# Patient Record
Sex: FEMALE | Race: WHITE | Employment: OTHER | URBAN - METROPOLITAN AREA
[De-identification: names, ages, dates, MRNs, and addresses within clinical notes are randomized per-mention and may not be internally consistent; named-entity substitution may affect disease eponyms.]

---

## 2019-06-25 PROCEDURE — 83520 IMMUNOASSAY QUANT NOS NONAB: CPT | Performed by: OBSTETRICS & GYNECOLOGY

## 2021-03-15 ENCOUNTER — LAB ENCOUNTER (OUTPATIENT)
Dept: LAB | Age: 36
End: 2021-03-15
Attending: OBSTETRICS & GYNECOLOGY
Payer: COMMERCIAL

## 2021-03-15 DIAGNOSIS — Z01.812 PRE-OPERATIVE LABORATORY EXAMINATION: ICD-10-CM

## 2021-03-16 LAB — SARS-COV-2 RNA RESP QL NAA+PROBE: NOT DETECTED

## 2021-07-14 NOTE — PROGRESS NOTES
Ann Marie P[facility]rez Consultation    Dear Dr. Maranda Carbajal,    Thank you for requesting ultrasound evaluation and maternal fetal medicine consultation on your patient Alessia Smiley.  As you are aware she is a 31year old female with a Singleto Estradiol (ESTRACE OR), Take by mouth., Disp: , Rfl:   •  FLUoxetine HCl (PROZAC) 20 MG Oral Cap, Take 20 mg by mouth daily. , Disp: , Rfl:   Allergies:   Vicodin [Hydrocodon*    NAUSEA AND VOMITING  Benadryl [Diphenhyd*    OTHER (SEE COMMENTS)    Comment:P birth and low birth weight in guerra pregnancies. ART does not appear to be an independent risk factor for adverse neurodevelopment outcome.  ART appears to be at increased risk of delivering offspring with congenital malformations compared with fertile that some medications may be used safely during pregnancy, knowledge regarding the risks of prenatal exposure to psychotropic medications is incomplete.  Thus, it is relatively common for patients to discontinue or to avoid pharmacologic treatment during pr Teratogenesis  The baseline incidence of major congenital malformations in newborns born in the [place] is estimated to be between 2 and 4%.  During the earliest stages of pregnancy, formation of major organ systems takes place and is complete within Nonteratogenic effects that may been seen in the  period include respiratory distress, cyanosis, apnea, siezures, temperature instability, feeding difficulty, vomiting, hypoglycemia, increased or decreased tone, irritability, crying, hyper-reflexia, SSRI's after delviery, given their prolonged half-life, clinical effectiveness can take several weeks. This exposes the patient to potentially significant risks of exacerbation of her mood disorder at a particularly vulnerable time.  This further emphasizes

## 2021-07-27 ENCOUNTER — OFFICE VISIT (OUTPATIENT)
Dept: PERINATAL CARE | Facility: HOSPITAL | Age: 36
End: 2021-07-27
Attending: OBSTETRICS & GYNECOLOGY
Payer: COMMERCIAL

## 2021-07-27 VITALS
WEIGHT: 172 LBS | SYSTOLIC BLOOD PRESSURE: 106 MMHG | BODY MASS INDEX: 28.66 KG/M2 | HEIGHT: 65 IN | HEART RATE: 68 BPM | DIASTOLIC BLOOD PRESSURE: 71 MMHG

## 2021-07-27 DIAGNOSIS — O09.819 ENCOUNTER FOR SUPERVISION OF PREGNANCY RESULTING FROM ASSISTED REPRODUCTIVE TECHNOLOGY: ICD-10-CM

## 2021-07-27 DIAGNOSIS — O09.819 PREGNANCY, HIGH-RISK, ASSISTED REPRODUCTIVE TECHNOLOGY: ICD-10-CM

## 2021-07-27 DIAGNOSIS — Z03.75 CERVICAL SHORTENING SUSPECTED BUT NOT FOUND: ICD-10-CM

## 2021-07-27 DIAGNOSIS — O09.819 ENCOUNTER FOR SUPERVISION OF PREGNANCY RESULTING FROM ASSISTED REPRODUCTIVE TECHNOLOGY: Primary | ICD-10-CM

## 2021-07-27 PROCEDURE — 76817 TRANSVAGINAL US OBSTETRIC: CPT | Performed by: OBSTETRICS & GYNECOLOGY

## 2021-07-27 PROCEDURE — 76811 OB US DETAILED SNGL FETUS: CPT | Performed by: OBSTETRICS & GYNECOLOGY

## 2021-07-27 PROCEDURE — 99244 OFF/OP CNSLTJ NEW/EST MOD 40: CPT | Performed by: OBSTETRICS & GYNECOLOGY

## 2021-07-27 PROCEDURE — 76817 TRANSVAGINAL US OBSTETRIC: CPT

## 2021-08-17 NOTE — PROGRESS NOTES
Consuelo Burnham     Dear Dr. Ary Lara,     Thank you for requesting ultrasound evaluation and maternal fetal medicine consultation on your patient Alessia Smiley.  As you are aware she is a 31year old female  with a S rhythm. The patient was made aware of the limitations of the fetal heart study: malformations such as but not limiting to minor valve , VSD, anomalous pulmonary venus return, or coarctation of the aorta maybe missed.  I discussed the results with the patien

## 2021-08-24 ENCOUNTER — ULTRASOUND ENCOUNTER (OUTPATIENT)
Dept: PERINATAL CARE | Facility: HOSPITAL | Age: 36
End: 2021-08-24
Attending: OBSTETRICS & GYNECOLOGY
Payer: COMMERCIAL

## 2021-08-24 VITALS
DIASTOLIC BLOOD PRESSURE: 74 MMHG | BODY MASS INDEX: 29.66 KG/M2 | HEART RATE: 78 BPM | WEIGHT: 178 LBS | HEIGHT: 65 IN | SYSTOLIC BLOOD PRESSURE: 110 MMHG

## 2021-08-24 DIAGNOSIS — O09.819 PREGNANCY, HIGH-RISK, ASSISTED REPRODUCTIVE TECHNOLOGY: ICD-10-CM

## 2021-08-24 DIAGNOSIS — O09.891 MEDICATION EXPOSURE DURING FIRST TRIMESTER OF PREGNANCY: ICD-10-CM

## 2021-08-24 DIAGNOSIS — F41.9 ANXIETY: ICD-10-CM

## 2021-08-24 DIAGNOSIS — O09.819 PREGNANCY, HIGH-RISK, ASSISTED REPRODUCTIVE TECHNOLOGY: Primary | ICD-10-CM

## 2021-08-24 PROCEDURE — 76827 ECHO EXAM OF FETAL HEART: CPT | Performed by: OBSTETRICS & GYNECOLOGY

## 2021-08-24 PROCEDURE — 76825 ECHO EXAM OF FETAL HEART: CPT | Performed by: OBSTETRICS & GYNECOLOGY

## 2021-08-24 PROCEDURE — 76827 ECHO EXAM OF FETAL HEART: CPT

## 2021-08-24 PROCEDURE — 99214 OFFICE O/P EST MOD 30 MIN: CPT | Performed by: OBSTETRICS & GYNECOLOGY

## 2021-08-24 PROCEDURE — 93325 DOPPLER ECHO COLOR FLOW MAPG: CPT | Performed by: OBSTETRICS & GYNECOLOGY

## 2021-08-24 PROCEDURE — 93325 DOPPLER ECHO COLOR FLOW MAPG: CPT

## 2021-12-11 ENCOUNTER — HOSPITAL ENCOUNTER (OUTPATIENT)
Facility: HOSPITAL | Age: 36
Setting detail: OBSERVATION
Discharge: HOME | End: 2021-12-11
Attending: OBSTETRICS & GYNECOLOGY | Admitting: OBSTETRICS & GYNECOLOGY
Payer: COMMERCIAL

## 2021-12-11 VITALS
SYSTOLIC BLOOD PRESSURE: 115 MMHG | HEIGHT: 65 IN | BODY MASS INDEX: 34.72 KG/M2 | RESPIRATION RATE: 18 BRPM | DIASTOLIC BLOOD PRESSURE: 72 MMHG | TEMPERATURE: 98 F | WEIGHT: 208.38 LBS | HEART RATE: 71 BPM

## 2021-12-11 PROBLEM — Z34.90 PREGNANCY: Status: ACTIVE | Noted: 2021-12-11

## 2021-12-11 PROCEDURE — 59025 FETAL NON-STRESS TEST: CPT

## 2021-12-11 PROCEDURE — 99214 OFFICE O/P EST MOD 30 MIN: CPT

## 2021-12-11 PROCEDURE — 4A0HXCZ MEASUREMENT OF PRODUCTS OF CONCEPTION, CARDIAC RATE, EXTERNAL APPROACH: ICD-10-PCS | Performed by: OBSTETRICS & GYNECOLOGY

## 2021-12-11 NOTE — NST
Nonstress Test   Patient: Alessia Smiley    Gestation: 39w4d    NST:       Variability: Moderate           Accelerations: Yes           Decelerations: None            Baseline: 140 BPM           Uterine Irritability: No           Contractions: Randle Mortimer

## 2021-12-11 NOTE — PROGRESS NOTES
Pt found dozing on cart, stating that she didn't sleep much last night.  Contractions are less painful while lying down. efm applied and pt instructed on plan of care

## 2021-12-11 NOTE — PROGRESS NOTES
at 44 & 4/7 weeks gestation presents to triage 2 reporting onset of contractions at 0100.   Patient states the contractions have become less intense since arriving at the hospital.  Patient also reports she had a gush of fluid on Thursday and another g

## 2021-12-11 NOTE — PROGRESS NOTES
Pt stated that\"I'd rather walk at home to get this going\"  Pt given labor precautions, use of a birthing ball for early labor, and kick counts. Pt discharged with instructions and verbalized understanding.   Pt also states that she \"only lives 15 min yobany

## 2021-12-13 ENCOUNTER — ANESTHESIA EVENT (OUTPATIENT)
Dept: OBGYN UNIT | Facility: HOSPITAL | Age: 36
End: 2021-12-13
Payer: COMMERCIAL

## 2021-12-13 ENCOUNTER — ANESTHESIA (OUTPATIENT)
Dept: OBGYN UNIT | Facility: HOSPITAL | Age: 36
End: 2021-12-13
Payer: COMMERCIAL

## 2021-12-13 ENCOUNTER — HOSPITAL ENCOUNTER (INPATIENT)
Facility: HOSPITAL | Age: 36
LOS: 2 days | Discharge: HOME | End: 2021-12-15
Attending: OBSTETRICS & GYNECOLOGY | Admitting: OBSTETRICS & GYNECOLOGY
Payer: COMMERCIAL

## 2021-12-13 PROCEDURE — 0HQ9XZZ REPAIR PERINEUM SKIN, EXTERNAL APPROACH: ICD-10-PCS | Performed by: OBSTETRICS & GYNECOLOGY

## 2021-12-13 PROCEDURE — 86900 BLOOD TYPING SEROLOGIC ABO: CPT | Performed by: OBSTETRICS & GYNECOLOGY

## 2021-12-13 PROCEDURE — 86780 TREPONEMA PALLIDUM: CPT | Performed by: OBSTETRICS & GYNECOLOGY

## 2021-12-13 PROCEDURE — 85025 COMPLETE CBC W/AUTO DIFF WBC: CPT | Performed by: OBSTETRICS & GYNECOLOGY

## 2021-12-13 PROCEDURE — 99214 OFFICE O/P EST MOD 30 MIN: CPT

## 2021-12-13 PROCEDURE — 86901 BLOOD TYPING SEROLOGIC RH(D): CPT | Performed by: OBSTETRICS & GYNECOLOGY

## 2021-12-13 PROCEDURE — 86850 RBC ANTIBODY SCREEN: CPT | Performed by: OBSTETRICS & GYNECOLOGY

## 2021-12-13 RX ORDER — BUPIVACAINE HCL/0.9 % NACL/PF 0.25 %
5 PLASTIC BAG, INJECTION (ML) EPIDURAL AS NEEDED
Status: DISCONTINUED | OUTPATIENT
Start: 2021-12-13 | End: 2021-12-13

## 2021-12-13 RX ORDER — DOCUSATE SODIUM 100 MG/1
100 CAPSULE, LIQUID FILLED ORAL
Status: DISCONTINUED | OUTPATIENT
Start: 2021-12-13 | End: 2021-12-15

## 2021-12-13 RX ORDER — IBUPROFEN 600 MG/1
600 TABLET ORAL EVERY 6 HOURS PRN
Status: DISCONTINUED | OUTPATIENT
Start: 2021-12-13 | End: 2021-12-13

## 2021-12-13 RX ORDER — IBUPROFEN 600 MG/1
600 TABLET ORAL EVERY 6 HOURS
Status: DISCONTINUED | OUTPATIENT
Start: 2021-12-13 | End: 2021-12-15

## 2021-12-13 RX ORDER — AMMONIA INHALANTS 0.04 G/.3ML
0.3 INHALANT RESPIRATORY (INHALATION) AS NEEDED
Status: DISCONTINUED | OUTPATIENT
Start: 2021-12-13 | End: 2021-12-13

## 2021-12-13 RX ORDER — ACETAMINOPHEN 500 MG
500 TABLET ORAL EVERY 6 HOURS PRN
Status: DISCONTINUED | OUTPATIENT
Start: 2021-12-13 | End: 2021-12-13

## 2021-12-13 RX ORDER — SODIUM CHLORIDE, SODIUM LACTATE, POTASSIUM CHLORIDE, CALCIUM CHLORIDE 600; 310; 30; 20 MG/100ML; MG/100ML; MG/100ML; MG/100ML
INJECTION, SOLUTION INTRAVENOUS CONTINUOUS
Status: DISCONTINUED | OUTPATIENT
Start: 2021-12-13 | End: 2021-12-13

## 2021-12-13 RX ORDER — LEVOTHYROXINE SODIUM 0.05 MG/1
50 TABLET ORAL
Status: DISCONTINUED | OUTPATIENT
Start: 2021-12-13 | End: 2021-12-15

## 2021-12-13 RX ORDER — ACETAMINOPHEN 325 MG/1
650 TABLET ORAL EVERY 6 HOURS PRN
Status: DISCONTINUED | OUTPATIENT
Start: 2021-12-13 | End: 2021-12-15

## 2021-12-13 RX ORDER — FLUOXETINE HYDROCHLORIDE 20 MG/1
40 CAPSULE ORAL DAILY
Status: DISCONTINUED | OUTPATIENT
Start: 2021-12-13 | End: 2021-12-15

## 2021-12-13 RX ORDER — BISACODYL 10 MG
10 SUPPOSITORY, RECTAL RECTAL ONCE AS NEEDED
Status: DISCONTINUED | OUTPATIENT
Start: 2021-12-13 | End: 2021-12-15

## 2021-12-13 RX ORDER — SIMETHICONE 80 MG
80 TABLET,CHEWABLE ORAL 3 TIMES DAILY PRN
Status: DISCONTINUED | OUTPATIENT
Start: 2021-12-13 | End: 2021-12-15

## 2021-12-13 RX ORDER — NALBUPHINE HCL 10 MG/ML
2.5 AMPUL (ML) INJECTION
Status: DISCONTINUED | OUTPATIENT
Start: 2021-12-13 | End: 2021-12-13

## 2021-12-13 RX ORDER — HYDROMORPHONE HYDROCHLORIDE 1 MG/ML
1 INJECTION, SOLUTION INTRAMUSCULAR; INTRAVENOUS; SUBCUTANEOUS ONCE
Status: COMPLETED | OUTPATIENT
Start: 2021-12-13 | End: 2021-12-13

## 2021-12-13 RX ORDER — TERBUTALINE SULFATE 1 MG/ML
0.25 INJECTION, SOLUTION SUBCUTANEOUS AS NEEDED
Status: DISCONTINUED | OUTPATIENT
Start: 2021-12-13 | End: 2021-12-13

## 2021-12-13 RX ORDER — TRISODIUM CITRATE DIHYDRATE AND CITRIC ACID MONOHYDRATE 500; 334 MG/5ML; MG/5ML
30 SOLUTION ORAL AS NEEDED
Status: DISCONTINUED | OUTPATIENT
Start: 2021-12-13 | End: 2021-12-13

## 2021-12-13 RX ORDER — DEXTROSE, SODIUM CHLORIDE, SODIUM LACTATE, POTASSIUM CHLORIDE, AND CALCIUM CHLORIDE 5; .6; .31; .03; .02 G/100ML; G/100ML; G/100ML; G/100ML; G/100ML
INJECTION, SOLUTION INTRAVENOUS AS NEEDED
Status: DISCONTINUED | OUTPATIENT
Start: 2021-12-13 | End: 2021-12-13

## 2021-12-13 RX ORDER — ONDANSETRON 2 MG/ML
4 INJECTION INTRAMUSCULAR; INTRAVENOUS EVERY 6 HOURS PRN
Status: DISCONTINUED | OUTPATIENT
Start: 2021-12-13 | End: 2021-12-13

## 2021-12-13 NOTE — ANESTHESIA PREPROCEDURE EVALUATION
PRE-OP EVALUATION    Patient Name: Alfredito Correa    Admit Diagnosis: preg state  Pregnancy    Pre-op Diagnosis: * No pre-op diagnosis entered *        Anesthesia Procedure: LABOR ANALGESIA    * No surgeons found in log *    Pre-op vitals reviewed.   Te at Unknown time  FLUoxetine 20 MG Oral Cap, Take 40 mg by mouth daily. , Disp: , Rfl: , 12/12/2021 at Unknown time        Allergies: Vicodin [Hydrocodone-Acetaminophen] and Benadryl [Diphenhydramine]      Anesthesia Evaluation    Patient summary reviewed. damage, paresthesias, headaches, and incomplete block. The consent was signed without further questions.    Plan/risks discussed with: patient                Present on Admission:  **None**

## 2021-12-13 NOTE — H&P
[facility]  History & Physical    Alessia Smiley Patient Status:  Inpatient    3/12/1985 MRN [id number]   Location [address] [facility] Attending Gareth Best, [address] [facility] Day # 0 PCP None Pcp     SUBJECTIVE:    Alessia Smiley is Take 40 mg by mouth daily. , Disp: , Rfl: , 12/12/2021 at Unknown time      Allergies:   Vicodin [Hydrocodon*    NAUSEA AND VOMITING  Benadryl [Diphenhyd*    OTHER (SEE COMMENTS)    Comment:Pt describes it makes her feel like she is             crawling out

## 2021-12-13 NOTE — PROGRESS NOTES
Pt  [facility] 23 presents to L&D with c/o ucs every 5-6 mins for past hour. Pt states she was here last Friday and sent home, states has been mercedes irregularly since States came in because she \"can't take it anymore\".  Pt denies vag bleeding, LO

## 2021-12-13 NOTE — PROGRESS NOTES
Mother and infant transported via wheelchair to mother baby in stable condiiton.  [place] and parent bands identified

## 2021-12-13 NOTE — ANESTHESIA PROCEDURE NOTES
Labor Analgesia  Performed by: Tabby Joe MD  Authorized by: Tabby Joe MD       General Information and Staff    Start Time:  12/13/2021 4:55 AM  End Time:  12/13/2021 5:05 AM  Anesthesiologist:  Tabby Joe MD  Performed by:   Anesthesiologi

## 2021-12-13 NOTE — L&D DELIVERY NOTE
Vaginal Delivery Note          Alessia Sherice Patient Status:  Inpatient    3/12/1985 MRN [id number]   Location [address] [place] [address] Attending Yony Miranda, [address] [facility] Day # 0 PCP N patient was then moved to the supine position in stable condition. Counts were correct.     Complications:  None    Greg Everett MD  12/13/2021  3:08 PM

## 2021-12-14 PROCEDURE — 85025 COMPLETE CBC W/AUTO DIFF WBC: CPT | Performed by: OBSTETRICS & GYNECOLOGY

## 2021-12-14 NOTE — PLAN OF CARE
Problem: PAIN - ADULT  Goal: Verbalizes/displays adequate comfort level or patient's stated pain goal  Description: INTERVENTIONS:  - Encourage pt to monitor pain and request assistance  - Assess pain using appropriate pain scale  - Administer analgesics POC  - See additional Care Plan goals for specific interventions  Outcome: Progressing  Goal: Patient/Family Short Term Goal  Description: Patient's Short Term Goal: safe vag delivery    Interventions:   - follow prescribed POC  - See additional Care Plan mother/other family members to express feelings/concerns, and actively listen. - Educate father/SO about benefits of breast feeding and how to manage common lactation challenges.   - Recommend avoidance of specific medications or substances incompatible wi Provide/instruct/assist with pericare as needed. - Provide VTE prophylaxis as needed. - Monitor bowel function.  - Encourage ambulation and provide assistance as needed.   - Assess and monitor emotional status and provide social service/psych resources as handouts and information on community breast feeding support.    Outcome: Progressing  Goal: Establishment of adequate milk supply with medication/procedure interruptions  Description: INTERVENTIONS:  - Review techniques for milk expression (breast pumping)

## 2021-12-14 NOTE — PROGRESS NOTES
Labor Analgesia Follow Up Note    Patient underwent epidural anesthesia for labor analgesia,    Placenta Date/Time: 12/13/2021  2:52 PM    Delivery Date/Time[de-identified] 12/13/2021  2:49 PM    /59 (BP Location: Left arm)   Pulse 72   Temp 98 °F (36.7 °C) (Oral

## 2021-12-15 ENCOUNTER — TELEPHONE (OUTPATIENT)
Dept: OBGYN UNIT | Facility: HOSPITAL | Age: 36
End: 2021-12-15

## 2021-12-15 VITALS
TEMPERATURE: 98 F | BODY MASS INDEX: 33.82 KG/M2 | DIASTOLIC BLOOD PRESSURE: 59 MMHG | SYSTOLIC BLOOD PRESSURE: 109 MMHG | OXYGEN SATURATION: 100 % | HEART RATE: 60 BPM | WEIGHT: 203 LBS | HEIGHT: 65 IN | RESPIRATION RATE: 18 BRPM

## 2021-12-15 PROBLEM — Z34.90 PREGNANCY: Status: RESOLVED | Noted: 2021-12-11 | Resolved: 2021-12-15

## 2021-12-15 RX ORDER — IBUPROFEN 600 MG/1
600 TABLET ORAL EVERY 6 HOURS PRN
Qty: 60 TABLET | Refills: 0 | Status: SHIPPED | OUTPATIENT
Start: 2021-12-15 | End: 2022-01-25

## 2021-12-15 NOTE — PROGRESS NOTES
Baby breastfeeding well, occas supplement w formula,all dc teaching reviewed earlier and all questions answered. Pt states comfortable caring for self and baby. Discharged in stable condition with family and accompanied by staff at [phone] per ambulatory.

## 2021-12-15 NOTE — PLAN OF CARE
Problem: PAIN - ADULT  Goal: Verbalizes/displays adequate comfort level or patient's stated pain goal  Description: INTERVENTIONS:  - Encourage pt to monitor pain and request assistance  - Assess pain using appropriate pain scale  - Administer analgesics pericare as needed. - Provide VTE prophylaxis as needed. - Monitor bowel function.  - Encourage ambulation and provide assistance as needed. - Assess and monitor emotional status and provide social service/psych resources as needed.   - Utilize standard community breast feeding support. Outcome: Completed  Goal: Establishment of adequate milk supply with medication/procedure interruptions  Description: INTERVENTIONS:  - Review techniques for milk expression (breast pumping).    - Provide pumping equipmen

## 2021-12-15 NOTE — PROGRESS NOTES
Pt teary and overwhelmed, care reviewed, w pt and , enc to go to ct, cradle call letter sent to my chart and remains on prozac

## 2021-12-15 NOTE — PROGRESS NOTES
PPD#1  Pt without complaints  /59 (BP Location: Left arm)   Pulse 72   Temp 98 °F (36.7 °C) (Oral)   Resp 18   Ht 5' 5\" (1.651 m)   Wt 203 lb (92.1 kg)   SpO2 100%   Breastfeeding Yes   BMI 33.78 kg/m²   Fundus firm at U-1  Lochia - appropriate  Ext

## 2021-12-18 ENCOUNTER — TELEPHONE (OUTPATIENT)
Dept: OBGYN UNIT | Facility: HOSPITAL | Age: 36
End: 2021-12-18

## 2021-12-22 ENCOUNTER — TELEPHONE (OUTPATIENT)
Dept: OBGYN UNIT | Facility: HOSPITAL | Age: 36
End: 2021-12-22

## 2021-12-22 NOTE — PROGRESS NOTES
Reviewed self and infant care w / mom, she verbalizes understanding of instructions reviewed. Encourage to follow up w/ MDs as directed and w/ questions/concerns. Remains on prozac, care reviewed, baby passed haring screen. enc to join ct

## 2023-02-06 ENCOUNTER — WALK IN (OUTPATIENT)
Dept: URGENT CARE | Age: 38
End: 2023-02-06

## 2023-02-06 VITALS
RESPIRATION RATE: 18 BRPM | OXYGEN SATURATION: 98 % | SYSTOLIC BLOOD PRESSURE: 105 MMHG | DIASTOLIC BLOOD PRESSURE: 71 MMHG | TEMPERATURE: 98.1 F | WEIGHT: 153 LBS | HEART RATE: 76 BPM

## 2023-02-06 DIAGNOSIS — J02.9 SORETHROAT: ICD-10-CM

## 2023-02-06 DIAGNOSIS — B34.9 PHARYNGITIS WITH VIRAL SYNDROME: Primary | ICD-10-CM

## 2023-02-06 DIAGNOSIS — J02.9 PHARYNGITIS WITH VIRAL SYNDROME: Primary | ICD-10-CM

## 2023-02-06 LAB
INTERNAL PROCEDURAL CONTROLS ACCEPTABLE: YES
S PYO AG THROAT QL IA.RAPID: NEGATIVE

## 2023-02-06 PROCEDURE — 99203 OFFICE O/P NEW LOW 30 MIN: CPT | Performed by: NURSE PRACTITIONER

## 2023-02-06 PROCEDURE — 87880 STREP A ASSAY W/OPTIC: CPT | Performed by: NURSE PRACTITIONER

## 2023-02-06 RX ORDER — LAMOTRIGINE 25 MG/1
50 TABLET ORAL DAILY
COMMUNITY
Start: 2023-01-16

## 2023-02-06 RX ORDER — PREDNISONE 20 MG/1
20 TABLET ORAL ONCE
Status: COMPLETED | OUTPATIENT
Start: 2023-02-06 | End: 2023-02-06

## 2023-02-06 RX ORDER — DEXTROAMPHETAMINE SACCHARATE, AMPHETAMINE ASPARTATE MONOHYDRATE, DEXTROAMPHETAMINE SULFATE AND AMPHETAMINE SULFATE 3.75; 3.75; 3.75; 3.75 MG/1; MG/1; MG/1; MG/1
15 CAPSULE, EXTENDED RELEASE ORAL DAILY
COMMUNITY

## 2023-02-06 RX ORDER — FLUOXETINE HYDROCHLORIDE 20 MG/1
20 CAPSULE ORAL EVERY MORNING
COMMUNITY
Start: 2023-01-16

## 2023-02-06 RX ORDER — BUPROPION HYDROCHLORIDE 300 MG/1
300 TABLET ORAL DAILY
COMMUNITY
Start: 2023-01-16

## 2023-02-06 RX ADMIN — PREDNISONE 20 MG: 20 TABLET ORAL at 19:20

## 2023-02-06 ASSESSMENT — ENCOUNTER SYMPTOMS
CONSTITUTIONAL NEGATIVE: 1
PSYCHIATRIC NEGATIVE: 1
NEUROLOGICAL NEGATIVE: 1
GASTROINTESTINAL NEGATIVE: 1
EYES NEGATIVE: 1
SORE THROAT: 1
RESPIRATORY NEGATIVE: 1

## 2023-02-21 ENCOUNTER — HOSPITAL ENCOUNTER (EMERGENCY)
Facility: HOSPITAL | Age: 38
Discharge: HOME | End: 2023-02-21
Attending: EMERGENCY MEDICINE
Payer: COMMERCIAL

## 2023-02-21 ENCOUNTER — APPOINTMENT (OUTPATIENT)
Dept: CT IMAGING | Facility: HOSPITAL | Age: 38
End: 2023-02-21
Attending: EMERGENCY MEDICINE
Payer: COMMERCIAL

## 2023-02-21 VITALS
BODY MASS INDEX: 24.59 KG/M2 | SYSTOLIC BLOOD PRESSURE: 106 MMHG | HEART RATE: 80 BPM | OXYGEN SATURATION: 100 % | WEIGHT: 153 LBS | RESPIRATION RATE: 14 BRPM | TEMPERATURE: 98 F | HEIGHT: 66.14 IN | DIASTOLIC BLOOD PRESSURE: 80 MMHG

## 2023-02-21 DIAGNOSIS — K52.9 GASTROENTERITIS: Primary | ICD-10-CM

## 2023-02-21 LAB
ALBUMIN SERPL-MCNC: 3.8 G/DL (ref 3.4–5)
ALBUMIN/GLOB SERPL: 1.1 {RATIO} (ref 1–2)
ALP LIVER SERPL-CCNC: 69 U/L
ALT SERPL-CCNC: 26 U/L
ANION GAP SERPL CALC-SCNC: 4 MMOL/L (ref 0–18)
AST SERPL-CCNC: 16 U/L (ref 15–37)
B-HCG UR QL: NEGATIVE
BASOPHILS # BLD AUTO: 0.03 X10(3) UL (ref 0–0.2)
BASOPHILS NFR BLD AUTO: 0.2 %
BILIRUB SERPL-MCNC: 0.9 MG/DL (ref 0.1–2)
BILIRUB UR QL STRIP.AUTO: NEGATIVE
BUN BLD-MCNC: 13 MG/DL (ref 7–18)
CALCIUM BLD-MCNC: 9.3 MG/DL (ref 8.5–10.1)
CHLORIDE SERPL-SCNC: 108 MMOL/L (ref 98–112)
CLARITY UR REFRACT.AUTO: CLEAR
CO2 SERPL-SCNC: 26 MMOL/L (ref 21–32)
COLOR UR AUTO: YELLOW
CREAT BLD-MCNC: 0.74 MG/DL
EOSINOPHIL # BLD AUTO: 0.03 X10(3) UL (ref 0–0.7)
EOSINOPHIL NFR BLD AUTO: 0.2 %
ERYTHROCYTE [DISTWIDTH] IN BLOOD BY AUTOMATED COUNT: 13 %
GFR SERPLBLD BASED ON 1.73 SQ M-ARVRAT: 108 ML/MIN/1.73M2 (ref 60–?)
GLOBULIN PLAS-MCNC: 3.6 G/DL (ref 2.8–4.4)
GLUCOSE BLD-MCNC: 136 MG/DL (ref 70–99)
GLUCOSE UR STRIP.AUTO-MCNC: NEGATIVE MG/DL
HCT VFR BLD AUTO: 44.4 %
HGB BLD-MCNC: 15 G/DL
IMM GRANULOCYTES # BLD AUTO: 0.04 X10(3) UL (ref 0–1)
IMM GRANULOCYTES NFR BLD: 0.3 %
KETONES UR STRIP.AUTO-MCNC: 20 MG/DL
LEUKOCYTE ESTERASE UR QL STRIP.AUTO: NEGATIVE
LIPASE SERPL-CCNC: 103 U/L (ref 73–393)
LIPASE SERPL-CCNC: 26 U/L (ref 13–75)
LYMPHOCYTES # BLD AUTO: 0.28 X10(3) UL (ref 1–4)
LYMPHOCYTES NFR BLD AUTO: 1.9 %
MCH RBC QN AUTO: 28.8 PG (ref 26–34)
MCHC RBC AUTO-ENTMCNC: 33.8 G/DL (ref 31–37)
MCV RBC AUTO: 85.2 FL
MONOCYTES # BLD AUTO: 0.38 X10(3) UL (ref 0.1–1)
MONOCYTES NFR BLD AUTO: 2.6 %
NEUTROPHILS # BLD AUTO: 13.83 X10 (3) UL (ref 1.5–7.7)
NEUTROPHILS # BLD AUTO: 13.83 X10(3) UL (ref 1.5–7.7)
NEUTROPHILS NFR BLD AUTO: 94.8 %
NITRITE UR QL STRIP.AUTO: NEGATIVE
OSMOLALITY SERPL CALC.SUM OF ELEC: 288 MOSM/KG (ref 275–295)
PH UR STRIP.AUTO: 7 [PH] (ref 5–8)
PLATELET # BLD AUTO: 305 10(3)UL (ref 150–450)
POTASSIUM SERPL-SCNC: 4 MMOL/L (ref 3.5–5.1)
PROT SERPL-MCNC: 7.4 G/DL (ref 6.4–8.2)
PROT UR STRIP.AUTO-MCNC: 30 MG/DL
RBC # BLD AUTO: 5.21 X10(6)UL
RBC #/AREA URNS AUTO: >10 /HPF
SODIUM SERPL-SCNC: 138 MMOL/L (ref 136–145)
SP GR UR STRIP.AUTO: 1.02 (ref 1–1.03)
UROBILINOGEN UR STRIP.AUTO-MCNC: <2 MG/DL
WBC # BLD AUTO: 14.6 X10(3) UL (ref 4–11)

## 2023-02-21 PROCEDURE — 80053 COMPREHEN METABOLIC PANEL: CPT | Performed by: EMERGENCY MEDICINE

## 2023-02-21 PROCEDURE — 81001 URINALYSIS AUTO W/SCOPE: CPT | Performed by: EMERGENCY MEDICINE

## 2023-02-21 PROCEDURE — 74176 CT ABD & PELVIS W/O CONTRAST: CPT | Performed by: EMERGENCY MEDICINE

## 2023-02-21 PROCEDURE — 85025 COMPLETE CBC W/AUTO DIFF WBC: CPT | Performed by: EMERGENCY MEDICINE

## 2023-02-21 PROCEDURE — 83690 ASSAY OF LIPASE: CPT | Performed by: EMERGENCY MEDICINE

## 2023-02-21 PROCEDURE — 99285 EMERGENCY DEPT VISIT HI MDM: CPT | Performed by: EMERGENCY MEDICINE

## 2023-02-21 PROCEDURE — 96374 THER/PROPH/DIAG INJ IV PUSH: CPT | Performed by: EMERGENCY MEDICINE

## 2023-02-21 PROCEDURE — 81025 URINE PREGNANCY TEST: CPT

## 2023-02-21 PROCEDURE — 99284 EMERGENCY DEPT VISIT MOD MDM: CPT | Performed by: EMERGENCY MEDICINE

## 2023-02-21 PROCEDURE — 96361 HYDRATE IV INFUSION ADD-ON: CPT | Performed by: EMERGENCY MEDICINE

## 2023-02-21 RX ORDER — ONDANSETRON 2 MG/ML
4 INJECTION INTRAMUSCULAR; INTRAVENOUS ONCE
Status: COMPLETED | OUTPATIENT
Start: 2023-02-21 | End: 2023-02-21

## 2023-02-21 RX ORDER — ACETAMINOPHEN 500 MG
1000 TABLET ORAL ONCE
Status: COMPLETED | OUTPATIENT
Start: 2023-02-21 | End: 2023-02-21

## 2023-02-21 RX ORDER — ONDANSETRON 4 MG/1
4 TABLET, ORALLY DISINTEGRATING ORAL EVERY 4 HOURS PRN
Qty: 10 TABLET | Refills: 0 | Status: SHIPPED | OUTPATIENT
Start: 2023-02-21 | End: 2023-02-28

## 2023-02-21 RX ORDER — LAMOTRIGINE 25 MG/1
TABLET ORAL DAILY
COMMUNITY

## 2023-02-21 RX ORDER — BUPROPION HYDROCHLORIDE 75 MG/1
TABLET ORAL DAILY
COMMUNITY

## 2023-02-21 RX ORDER — ONDANSETRON 2 MG/ML
4 INJECTION INTRAMUSCULAR; INTRAVENOUS ONCE
Status: DISCONTINUED | OUTPATIENT
Start: 2023-02-21 | End: 2023-02-21

## 2023-02-21 RX ORDER — DEXTROAMPHETAMINE SACCHARATE, AMPHETAMINE ASPARTATE, DEXTROAMPHETAMINE SULFATE AND AMPHETAMINE SULFATE 5; 5; 5; 5 MG/1; MG/1; MG/1; MG/1
TABLET ORAL DAILY
COMMUNITY

## 2023-05-16 ENCOUNTER — WALK IN (OUTPATIENT)
Dept: URGENT CARE | Age: 38
End: 2023-05-16

## 2023-05-16 VITALS
SYSTOLIC BLOOD PRESSURE: 120 MMHG | DIASTOLIC BLOOD PRESSURE: 80 MMHG | OXYGEN SATURATION: 96 % | TEMPERATURE: 97.4 F | HEART RATE: 93 BPM | RESPIRATION RATE: 16 BRPM

## 2023-05-16 DIAGNOSIS — H10.9 CONJUNCTIVITIS, BACTERIAL: Primary | ICD-10-CM

## 2023-05-16 PROCEDURE — 99213 OFFICE O/P EST LOW 20 MIN: CPT | Performed by: NURSE PRACTITIONER

## 2023-05-16 RX ORDER — ERYTHROMYCIN 5 MG/G
OINTMENT OPHTHALMIC
Qty: 3.5 G | Refills: 0 | Status: SHIPPED | OUTPATIENT
Start: 2023-05-16 | End: 2023-05-21

## 2023-05-16 ASSESSMENT — ENCOUNTER SYMPTOMS
NEUROLOGICAL NEGATIVE: 1
EYE ITCHING: 1
PSYCHIATRIC NEGATIVE: 1
RESPIRATORY NEGATIVE: 1
EYE DISCHARGE: 1
EYE REDNESS: 1
CONSTITUTIONAL NEGATIVE: 1
GASTROINTESTINAL NEGATIVE: 1

## (undated) NOTE — Clinical Note
IUP at 20w0d  Normal level 2 ultrasound  Anxiety, controlled on Prozac  Pregnancy from assisted reproductive technology    RECOMMENDATIONS:  Continue care with Dr. Ellen Dolan  Fetal echocardiogram in 4 weeks  Third trimester ultrasound  Continue to monitor for

## (undated) NOTE — Clinical Note
IUP at 24w0d  Normal fetal echocardiogram  Anxiety, controlled on Prozac  Pregnancy from assisted reproductive technology     RECOMMENDATIONS:  Continue care with Dr. Lee Muhammad trimester ultrasound  Continue to monitor for exacerbation of her anxiety